# Patient Record
Sex: FEMALE | Race: AMERICAN INDIAN OR ALASKA NATIVE
[De-identification: names, ages, dates, MRNs, and addresses within clinical notes are randomized per-mention and may not be internally consistent; named-entity substitution may affect disease eponyms.]

---

## 2017-08-12 ENCOUNTER — HOSPITAL ENCOUNTER (EMERGENCY)
Dept: HOSPITAL 5 - ED | Age: 27
Discharge: HOME | End: 2017-08-12
Payer: COMMERCIAL

## 2017-08-12 VITALS — SYSTOLIC BLOOD PRESSURE: 120 MMHG | DIASTOLIC BLOOD PRESSURE: 81 MMHG

## 2017-08-12 DIAGNOSIS — R51: ICD-10-CM

## 2017-08-12 DIAGNOSIS — M54.2: ICD-10-CM

## 2017-08-12 DIAGNOSIS — G40.909: Primary | ICD-10-CM

## 2017-08-12 DIAGNOSIS — F31.9: ICD-10-CM

## 2017-08-12 DIAGNOSIS — F17.200: ICD-10-CM

## 2017-08-12 LAB
ANION GAP SERPL CALC-SCNC: 22 MMOL/L
BUN SERPL-MCNC: 15 MG/DL (ref 7–17)
BUN/CREAT SERPL: 25 %
CALCIUM SERPL-MCNC: 8.2 MG/DL (ref 8.4–10.2)
CHLORIDE SERPL-SCNC: 100.6 MMOL/L (ref 98–107)
CO2 SERPL-SCNC: 20 MMOL/L (ref 22–30)
GLUCOSE SERPL-MCNC: 58 MG/DL (ref 65–100)
HCT VFR BLD CALC: 29.9 % (ref 30.3–42.9)
HGB BLD-MCNC: 9.2 GM/DL (ref 10.1–14.3)
MCH RBC QN AUTO: 21 PG (ref 28–32)
MCHC RBC AUTO-ENTMCNC: 31 % (ref 30–34)
MCV RBC AUTO: 68 FL (ref 79–97)
PLATELET # BLD: 384 K/MM3 (ref 140–440)
POTASSIUM SERPL-SCNC: 4.3 MMOL/L (ref 3.6–5)
RBC # BLD AUTO: 4.38 M/MM3 (ref 3.65–5.03)
SODIUM SERPL-SCNC: 138 MMOL/L (ref 137–145)
WBC # BLD AUTO: 4.3 K/MM3 (ref 4.5–11)

## 2017-08-12 PROCEDURE — 72125 CT NECK SPINE W/O DYE: CPT

## 2017-08-12 PROCEDURE — 80048 BASIC METABOLIC PNL TOTAL CA: CPT

## 2017-08-12 PROCEDURE — 36415 COLL VENOUS BLD VENIPUNCTURE: CPT

## 2017-08-12 PROCEDURE — 82962 GLUCOSE BLOOD TEST: CPT

## 2017-08-12 PROCEDURE — 70450 CT HEAD/BRAIN W/O DYE: CPT

## 2017-08-12 PROCEDURE — 80164 ASSAY DIPROPYLACETIC ACD TOT: CPT

## 2017-08-12 PROCEDURE — 84703 CHORIONIC GONADOTROPIN ASSAY: CPT

## 2017-08-12 PROCEDURE — 85027 COMPLETE CBC AUTOMATED: CPT

## 2017-08-12 PROCEDURE — 80185 ASSAY OF PHENYTOIN TOTAL: CPT

## 2017-08-12 NOTE — CAT SCAN REPORT
CT scan of cervical spine:



History: Neck pain. Previous fusion.



Findings:



The odontoid process and the lateral mass appears intact. No evidence 

of fracture.  Normal height of vertebral bodies and intervertebral 

disc. Normal prevertebral soft tissue.



Stable posterior C1-C2 fusion.



Impression:



No evidence of acute fracture.

## 2017-08-12 NOTE — CAT SCAN REPORT
CT scan of head without IV contrast:



History:, Trauma. Headache



Findings:



Ventricles are normal in size and midline in location. No evidence of 

acute ischemia, hemorrhage or mass. No extra axial fluid collection. 

Normal brainstem and cerebellum.



Mucosal edema of right maxillary sinus. Normal mastoid air cells.



Impression:



No intracranial abnormality.

Sinus disease.

## 2017-08-12 NOTE — EMERGENCY DEPARTMENT REPORT
ED Seizure HPI





- General


Chief Complaint: Seizure


Stated Complaint: SEIZURE


Time Seen by Provider: 08/12/17 10:46


Source: patient, EMS


Mode of arrival: Stretcher


Limitations: No Limitations





- History of Present Illness


Initial Comments: 


The patient states that she was taking 2 mg of Ativan 3 times a day until 2 

days ago.  She is in a detox program at [facility].  She claims that her 

benzodiazepine was completely discontinued at that time.  She states that 

[facility] did not wean her off and that is why she had a seizure today in her 

opinion.  She is blaming [facility] for this.  She also states that her chronic 

pain medication was abruptly discontinued.  She states that she has chronic 

pain from a pelvic floor reconstruction.





Patient states that she had a seizure today which involve loss of consciousness 

and injury to her forehead and is associated with posterior neck pain.  She is 

status post cervical fusion.  She states that she has a headache and is dizzy 

at this time.  Report from EMS is that the patient had a "'Absense Episode" en 

Route.  No Ativan was given an route to this facility.





MD Complaint: seizure


-: unknown


Description of Episode: loss of consciousness


Witnessed:: Yes


Trauma: Yes (according to patient)


Seizure History: known seizure disorder


Place: other ([facility])


Possible Precipitating Event: medication


Associated Symptoms: denies other symptoms


Treatments Prior to Arrival: none





- Related Data


 Previous Rx's











 Medication  Instructions  Recorded  Last Taken  Type


 


LORazepam [Ativan] 1 mg PO Q12H PRN #7 tablet 08/12/17 Unknown Rx











 Allergies











Allergy/AdvReac Type Severity Reaction Status Date / Time


 


amoxicillin trihydrate Allergy  Hives Verified 08/12/17 09:50





[From Augmentin]     


 


Penicillins Allergy  Itching Verified 08/12/17 09:50


 


potassium clavulanate Allergy  Hives Verified 08/12/17 09:50





[From Augmentin]     














ED Review of Systems


ROS: 


Stated complaint: SEIZURE


Other details as noted in HPI





Constitutional: denies: chills, fever


Eyes: denies: eye pain, eye discharge, vision change


ENT: denies: ear pain, throat pain


Respiratory: denies: cough, shortness of breath, wheezing


Cardiovascular: denies: chest pain, palpitations


Endocrine: no symptoms reported


Gastrointestinal: denies: abdominal pain, nausea, diarrhea


Genitourinary: denies: urgency, dysuria, discharge


Musculoskeletal: denies: back pain, joint swelling, arthralgia


Skin: denies: rash, lesions


Neurological: as per HPI.  denies: headache, weakness, paresthesias


Psychiatric: denies: anxiety, depression


Hematological/Lymphatic: denies: easy bleeding, easy bruising





ED Past Medical Hx





- Past Medical History


Hx Seizures: Yes


Hx Psychiatric Treatment: Yes (PTSD,ADHD,anxiety,bipolar)


Additional medical history: aplastic anemia.  traumatic brain injury.  

hypoglycemia





- Surgical History


Additional Surgical History: pelvic floor repair





- Social History


Smoking Status: Current Every Day Smoker


Substance Use Type: None





- Medications


Home Medications: 


 Home Medications











 Medication  Instructions  Recorded  Confirmed  Last Taken  Type


 


LORazepam [Ativan] 1 mg PO Q12H PRN #7 tablet 08/12/17  Unknown Rx














ED Physical Exam





- General


Limitations: No Limitations


General appearance: alert, in no apparent distress





- Head


Head exam: Present: atraumatic, normocephalic





- Eye


Eye exam: Present: normal appearance, PERRL, EOMI.  Absent: scleral icterus


Pupils: Present: normal accommodation





- ENT


ENT exam: Present: normal exam, mucous membranes moist





- Neck


Neck exam: Present: normal inspection, tenderness (subjective appears to be non-

vertebral), other (surgical scar intact).  Absent: meningismus





- Respiratory


Respiratory exam: Present: normal lung sounds bilaterally.  Absent: respiratory 

distress





- Cardiovascular


Cardiovascular Exam: Present: regular rate, normal rhythm.  Absent: systolic 

murmur, diastolic murmur, rubs, gallop





- GI/Abdominal


GI/Abdominal exam: Present: soft, normal bowel sounds.  Absent: distended, 

tenderness, guarding, rebound, rigid





- Extremities Exam


Extremities exam: Present: normal inspection





- Back Exam


Back exam: Present: normal inspection





- Neurological Exam


Neurological exam: Present: alert, oriented X3, CN II-XII intact.  Absent: 

motor sensory deficit





- Psychiatric


Psychiatric exam: Present: normal affect, normal mood





- Skin


Skin exam: Present: warm, dry, intact, normal color.  Absent: rash





ED Course


 Vital Signs











  08/12/17 08/12/17





  09:45 11:35


 


Temperature 98.3 F 


 


Pulse Rate 90 87


 


Respiratory 18 18





Rate  


 


Blood Pressure 115/78 


 


Blood Pressure  120/81





[Left]  


 


O2 Sat by Pulse 99 99





Oximetry  














- Reevaluation(s)


Reevaluation #1: 


Patient states that she is not under 1013.  She states that she will be 

discharged from [facility].  She states that she will be returning to [facility] now for 

discharge.  I am going to give her prescription for a few Ativan just in case 

her seizure is related to withdrawal.  However at this juncture I really find 

no convincing evidence of a withdrawal state.


08/12/17 12:36





Reevaluation #2: 


Patient was found to be fully ambulatory and dressed awaiting discharge.  She 

is neurologically intact.


08/12/17 12:44








ED Medical Decision Making





- Lab Data


Result diagrams: 


 08/12/17 09:59





 08/12/17 09:59








 Laboratory Results - last 24 hr











  08/12/17 08/12/17 08/12/17





  09:59 09:59 09:59


 


WBC    4.3 L


 


RBC    4.38


 


Hgb    9.2 L


 


Hct    29.9 L


 


MCV    68 L


 


MCH    21 L


 


MCHC    31


 


RDW    19.0 H


 


Plt Count    384


 


Sodium   


 


Potassium   


 


Chloride   


 


Carbon Dioxide   


 


Anion Gap   


 


BUN   


 


Creatinine   


 


Estimated GFR   


 


BUN/Creatinine Ratio   


 


Glucose   


 


Calcium   


 


HCG, Qual   Negative 


 


Phenytoin  11.6  


 


Valproic Acid  43.0 L  














  08/12/17





  09:59


 


WBC 


 


RBC 


 


Hgb 


 


Hct 


 


MCV 


 


MCH 


 


MCHC 


 


RDW 


 


Plt Count 


 


Sodium  138


 


Potassium  4.3


 


Chloride  100.6


 


Carbon Dioxide  20 L


 


Anion Gap  22


 


BUN  15


 


Creatinine  0.6 L


 


Estimated GFR  > 60


 


BUN/Creatinine Ratio  25.00


 


Glucose  58 L


 


Calcium  8.2 L


 


HCG, Qual 


 


Phenytoin 


 


Valproic Acid 














- Radiology Data


Radiology results: report reviewed (CT head shows no intracranial abnormality.  

CT of the cervical spine shows previous C1-C2 fusion otherwise no acute process)


Critical care attestation.: 


If time is entered above; I have spent that time in minutes in the direct care 

of this critically ill patient, excluding procedure time.








ED Disposition


Clinical Impression: 


 Seizure, Seizure disorder, Neck pain





Headache


Qualifiers:


 Headache type: unspecified Headache chronicity pattern: unspecified pattern 

Intractability: not intractable Qualified Code(s): R51 - Headache





Disposition: DC-01 TO HOME OR SELFCARE


Is pt being admited?: No


Does the pt Need Aspirin: No


Condition: Stable


Instructions:  Recurrent Seizures Adult (ED), Chronic Pain (ED)


Additional Instructions: 


Follow-up with your usual care providers.  Return any acute change or problem.  

Ativan as directed.


Prescriptions: 


LORazepam [Ativan] 1 mg PO Q12H PRN #7 tablet


 PRN Reason: Anxiety


Referrals: 


PRIMARY CARE,MD [Primary Care Provider] - 3-5 Days


Time of Disposition: 12:36

## 2018-04-05 ENCOUNTER — HOSPITAL ENCOUNTER (EMERGENCY)
Dept: HOSPITAL 5 - ED | Age: 28
LOS: 1 days | Discharge: HOME | End: 2018-04-06
Payer: MEDICARE

## 2018-04-05 DIAGNOSIS — Z88.0: ICD-10-CM

## 2018-04-05 DIAGNOSIS — Z3A.01: ICD-10-CM

## 2018-04-05 DIAGNOSIS — Z88.1: ICD-10-CM

## 2018-04-05 DIAGNOSIS — O26.891: Primary | ICD-10-CM

## 2018-04-05 DIAGNOSIS — Z88.8: ICD-10-CM

## 2018-04-05 DIAGNOSIS — Z87.891: ICD-10-CM

## 2018-04-05 PROCEDURE — 36415 COLL VENOUS BLD VENIPUNCTURE: CPT

## 2018-04-05 PROCEDURE — 76801 OB US < 14 WKS SINGLE FETUS: CPT

## 2018-04-05 PROCEDURE — 76817 TRANSVAGINAL US OBSTETRIC: CPT

## 2018-04-05 PROCEDURE — 81001 URINALYSIS AUTO W/SCOPE: CPT

## 2018-04-05 PROCEDURE — 80053 COMPREHEN METABOLIC PANEL: CPT

## 2018-04-05 PROCEDURE — 99284 EMERGENCY DEPT VISIT MOD MDM: CPT

## 2018-04-05 PROCEDURE — 85025 COMPLETE CBC W/AUTO DIFF WBC: CPT

## 2018-04-05 PROCEDURE — 84702 CHORIONIC GONADOTROPIN TEST: CPT

## 2018-04-06 VITALS — DIASTOLIC BLOOD PRESSURE: 74 MMHG | SYSTOLIC BLOOD PRESSURE: 129 MMHG

## 2018-04-06 LAB
ALBUMIN SERPL-MCNC: 4.4 G/DL (ref 3.9–5)
ALT SERPL-CCNC: 14 UNITS/L (ref 7–56)
BASOPHILS # (AUTO): 0 K/MM3 (ref 0–0.1)
BASOPHILS NFR BLD AUTO: 0.8 % (ref 0–1.8)
BILIRUB UR QL STRIP: (no result)
BLOOD UR QL VISUAL: (no result)
BUN SERPL-MCNC: 12 MG/DL (ref 7–17)
BUN/CREAT SERPL: 24 %
CALCIUM SERPL-MCNC: 8.6 MG/DL (ref 8.4–10.2)
EOSINOPHIL # BLD AUTO: 0.1 K/MM3 (ref 0–0.4)
EOSINOPHIL NFR BLD AUTO: 1.8 % (ref 0–4.3)
HCT VFR BLD CALC: 33.2 % (ref 30.3–42.9)
HEMOLYSIS INDEX: 22
HGB BLD-MCNC: 10 GM/DL (ref 10.1–14.3)
LYMPHOCYTES # BLD AUTO: 2.4 K/MM3 (ref 1.2–5.4)
LYMPHOCYTES NFR BLD AUTO: 38.8 % (ref 13.4–35)
MCH RBC QN AUTO: 22 PG (ref 28–32)
MCHC RBC AUTO-ENTMCNC: 30 % (ref 30–34)
MCV RBC AUTO: 72 FL (ref 79–97)
MONOCYTES # (AUTO): 0.4 K/MM3 (ref 0–0.8)
MONOCYTES % (AUTO): 6.7 % (ref 0–7.3)
MUCOUS THREADS #/AREA URNS HPF: (no result) /HPF
PH UR STRIP: 5 [PH] (ref 5–7)
PLATELET # BLD: 504 K/MM3 (ref 140–440)
PROT UR STRIP-MCNC: (no result) MG/DL
RBC # BLD AUTO: 4.59 M/MM3 (ref 3.65–5.03)
RBC #/AREA URNS HPF: 1 /HPF (ref 0–6)
UROBILINOGEN UR-MCNC: < 2 MG/DL (ref ?–2)
WBC #/AREA URNS HPF: 1 /HPF (ref 0–6)

## 2018-04-06 NOTE — EMERGENCY DEPARTMENT REPORT
HPI





- General


Chief Complaint: Abdominal Pain


Time Seen by Provider: 04/06/18 03:03





- HPI


HPI: 





patient with pelvic discomfort, states she was recently diagnosed with 

pregnancy last menstrual cycle about 3 weeks ago.  Patient is admitted to 

psychiatric facility for depression with SI.  She states she been having some 

pelvic discomfort for the past 3 days, nausea but no vomiting.  Patient has not 

taking any medications for her symptoms.  She stated history of chronic pelvic 

discomfort, and history of chronic neck pain.  Since she's been at the 

psychiatric facility she has not received any pain medications for her 

symptoms.  Patient's complaint of saying scant blood when wiping after 

urinating.





ED Past Medical Hx





- Past Medical History


Previous Medical History?: Yes


Hx Hypertension: No


Hx Seizures: Yes


Hx Psychiatric Treatment: Yes (PTSD,ADHD,anxiety,bipolar)


Additional medical history: aplastic anemia.  traumatic brain injury.  

hypoglycemia





- Surgical History


Past Surgical History?: Yes


Additional Surgical History: pelvic floor repair





- Social History


Smoking Status: Former Smoker


Substance Use Type: Marijuana





- Medications


Home Medications: 


 Home Medications











 Medication  Instructions  Recorded  Confirmed  Last Taken  Type


 


LORazepam [Ativan] 1 mg PO Q12H PRN #7 tablet 08/12/17  Unknown Rx


 


Acetaminophen [Tylenol Extra 500 mg PO BID PRN 30 Days #60 04/06/18  Unknown Rx





Strength] tablet    














ED Review of Systems


ROS: 


Stated complaint: ABD PAIN


Other details as noted in HPI





Comment: All other systems reviewed and negative


Gastrointestinal: nausea.  denies: abdominal pain, vomiting


Genitourinary: discharge.  denies: urgency, hematuria





Physical Exam





- Physical Exam


Vital Signs: 


 Vital Signs











  04/06/18 04/06/18





  01:01 03:37


 


Temperature 98.7 F 


 


Pulse Rate 87 84


 


Respiratory 18 20





Rate  


 


Blood Pressure 120/65 


 


Blood Pressure  129/74





[Left]  


 


O2 Sat by Pulse 100 99





Oximetry  











Physical Exam: 





- Physical Exam


Physical Exam: 





- General


Limitations: No Limitations


General appearance: alert, in no apparent distress.





- Head


Head exam: Present: atraumatic, normocephalic





- Eye


Eye exam: Present: normal appearance





- ENT


ENT exam: Present: mucous membranes moist





- Neck


Neck exam: Present: normal inspection





- Respiratory


Respiratory exam: Present: normal lung sounds bilaterally.  Absent: respiratory 

distress





- Cardiovascular


Cardiovascular Exam: Present: normal rhythm, tachycardia.  Absent: systolic 

murmur, diastolic murmur, rubs, gallop





- GI/Abdominal


GI/Abdominal exam: Present: soft, normal bowel sounds





- Extremities Exam


Extremities exam: Present: normal inspection





- Back Exam


Back exam: Present: normal inspection





- Neurological Exam


Neurological exam: Present: alert, oriented X3





- Psychiatric


Psychiatric exam: normal affect and mood





- Skin


Skin exam: Present: warm, dry, intact, normal color.  Absent: rash- Physical 

Exam


Physical Exam: 





-: Patient refused





ED Course


 Vital Signs











  04/06/18 04/06/18





  01:01 03:37


 


Temperature 98.7 F 


 


Pulse Rate 87 84


 


Respiratory 18 20





Rate  


 


Blood Pressure 120/65 


 


Blood Pressure  129/74





[Left]  


 


O2 Sat by Pulse 100 99





Oximetry  














- Reevaluation(s)


Reevaluation #1: 





04/06/18 06:52


Patient requesting opiate prescriptions.  I advised patient that she would need 

to take Tylenol extra strength and to follow up 2 days for hCG monitoring.  I 

advised patient that we should try non opiate analgesic first before committing 

to opiate in the setting of her living in a psychiatric facility, in the 

setting of her pregnancy, in the setting of current opiate crisis climate.





ED Medical Decision Making





- Lab Data


Result diagrams: 


 04/06/18 01:17





 04/06/18 01:17


Critical care attestation.: 


If time is entered above; I have spent that time in minutes in the direct care 

of this critically ill patient, excluding procedure time.








ED Disposition


Clinical Impression: 


Pelvic pain affecting pregnancy


Qualifiers:


 Trimester: first trimester Qualified Code(s): O26.891 - Other specified 

pregnancy related conditions, first trimester





Disposition: DC-01 TO HOME OR SELFCARE


Is pt being admited?: No


Does the pt Need Aspirin: No


Condition: Stable


Instructions:  Pregnancy (ED), Abdominal Pain (ED)


Prescriptions: 


Acetaminophen [Tylenol Extra Strength] 500 mg PO BID PRN 30 Days #60 tablet


 PRN Reason: Pain


Referrals: 


QUINCY CARPENTER MD [Primary Care Provider] - 3-5 Days

## 2018-04-06 NOTE — ULTRASOUND REPORT
FINAL REPORT



PROCEDURE:  US OB TRANSVAGINAL



TECHNIQUE:  Real-time transvaginal sonography of the uterus,

placenta, amniotic fluid, adnexa, and fetus was performed with

image documentation. Measurements were obtained to determine

fetal age/size. M-mode Doppler was used to document fetal

heartbeat. CPT 78766



HISTORY:  severe pelvic pain, bleeding 



COMPARISON:  No prior studies are available for comparison.



FINDINGS:  

No intrauterine pregnancy is identified. 



The uterus measures 8.1 x 5.1 x 5.1 centimeters. The endometrium

is thickened at 19.5 millimeters. There is no endometrial fluid. 



The right ovary measures 4.2 x 2.8 x 3.3 centimeters. There are 2

cysts measuring 2.5 x 1.3 centimeters. The left ovary measures 3

x 2 x 1.7 centimeters. 



There is no free fluid. 



IMPRESSION:  

There is no evidence of intrauterine or ectopic pregnancy.

## 2018-04-09 NOTE — ULTRASOUND REPORT
FINAL REPORT



EXAM:  US OB &lt; = 14 WEEKS FETUS



HISTORY:  SEVERE RIGHT SIDED PELVIC PAIN, BLEEDING 



COMPARISONS:  Transvaginal ultrasound of the same date. Note that

this examination was obtained on 04/06/2018 but not submitted for

interpretation until 04/09/2018.



FINDINGS:  

Transabdominal grayscale and color Doppler ultrasound evaluation

of the pelvis 



Poorly visualized uterus measures up to 5.2 cm in transverse

dimension. The pelvis is almost entirely obscured by bowel gas,

and pelvic organs are much better visualized on transvaginal

ultrasound of the same date. 



IMPRESSION:  

Poorly visualized pelvic organs on transabdominal ultrasound due

to overlying bowel gas. Please see transvaginal ultrasound of the

same date.